# Patient Record
Sex: MALE | Race: BLACK OR AFRICAN AMERICAN | URBAN - METROPOLITAN AREA
[De-identification: names, ages, dates, MRNs, and addresses within clinical notes are randomized per-mention and may not be internally consistent; named-entity substitution may affect disease eponyms.]

---

## 2024-10-04 ENCOUNTER — EDUCATION (OUTPATIENT)
Dept: HEMATOLOGY/ONCOLOGY | Facility: CLINIC | Age: 49
End: 2024-10-04

## 2024-10-04 DIAGNOSIS — Z52.001 STEM CELL DONOR: Primary | ICD-10-CM

## 2024-11-04 PROCEDURE — 81378 HLA I & II TYPING HR: CPT | Performed by: INTERNAL MEDICINE

## 2024-11-04 PROCEDURE — 81382 HLA II TYPING 1 LOC HR: CPT | Performed by: INTERNAL MEDICINE

## 2024-11-05 ENCOUNTER — LAB VISIT (OUTPATIENT)
Dept: LAB | Facility: HOSPITAL | Age: 49
End: 2024-11-05
Payer: MEDICARE

## 2024-11-05 DIAGNOSIS — Z52.001 STEM CELL DONOR: ICD-10-CM

## 2024-11-05 PROCEDURE — 86900 BLOOD TYPING SEROLOGIC ABO: CPT | Performed by: INTERNAL MEDICINE

## 2024-11-05 PROCEDURE — 36415 COLL VENOUS BLD VENIPUNCTURE: CPT | Performed by: INTERNAL MEDICINE

## 2024-11-06 LAB — ABO + RH BLD: NORMAL

## 2024-11-07 LAB — HLATY INTERPRETATION: NORMAL

## 2024-12-11 ENCOUNTER — DOCUMENTATION ONLY (OUTPATIENT)
Dept: HEMATOLOGY/ONCOLOGY | Facility: CLINIC | Age: 49
End: 2024-12-11
Payer: MEDICARE

## 2024-12-11 LAB
HLA DQA1 1: NORMAL
HLA DQA1 2: NORMAL
HLA DRB4 1: NORMAL
HLA-A 1: NORMAL
HLA-A 2: NORMAL
HLA-B 1: NORMAL
HLA-B 2: NORMAL
HLA-C 1: NORMAL
HLA-C 2: NORMAL
HLA-DPA1 1: NORMAL
HLA-DPA1 2: NORMAL
HLA-DPB1 1: NORMAL
HLA-DPB1 2: NORMAL
HLA-DQB1 1: NORMAL
HLA-DQB1 2: NORMAL
HLA-DRB1 1: NORMAL
HLA-DRB1 2: NORMAL
HLA-DRB3 1: NORMAL
HLA-DRB3 2: NORMAL
HLA-DRB4 2: NORMAL
HLA-DRB5 1: NORMAL
HLA-DRB5 2: NORMAL
Lab: NORMAL
Lab: NORMAL

## 2024-12-13 ENCOUNTER — TELEPHONE (OUTPATIENT)
Dept: HEMATOLOGY/ONCOLOGY | Facility: CLINIC | Age: 49
End: 2024-12-13
Payer: MEDICARE

## 2024-12-13 NOTE — TELEPHONE ENCOUNTER
----- Message from Neeraj sent at 12/12/2024  8:27 AM CST -----  Regarding: Returning a Missed Call  Contact: [phone]  Returning a Missed Call     Caller:Gabriel Nayak        Returning call to: Maranda Saunders     Caller can be reached @: [phone]     Nature of the call: regarding hla typing results

## 2025-05-05 DIAGNOSIS — Z52.001 STEM CELL DONOR: Primary | ICD-10-CM

## 2025-05-08 ENCOUNTER — DOCUMENTATION ONLY (OUTPATIENT)
Dept: HEMATOLOGY/ONCOLOGY | Facility: CLINIC | Age: 50
End: 2025-05-08
Payer: MEDICARE

## 2025-05-08 VITALS — BODY MASS INDEX: 34.13 KG/M2 | WEIGHT: 295 LBS | HEIGHT: 78 IN

## 2025-05-27 ENCOUNTER — OFFICE VISIT (OUTPATIENT)
Dept: HEMATOLOGY/ONCOLOGY | Facility: CLINIC | Age: 50
End: 2025-05-27
Payer: MEDICARE

## 2025-05-27 ENCOUNTER — HOSPITAL ENCOUNTER (OUTPATIENT)
Dept: CARDIOLOGY | Facility: CLINIC | Age: 50
Discharge: HOME | End: 2025-05-27
Payer: MEDICARE

## 2025-05-27 ENCOUNTER — HOSPITAL ENCOUNTER (OUTPATIENT)
Dept: RADIOLOGY | Facility: HOSPITAL | Age: 50
Discharge: HOME | End: 2025-05-27
Attending: INTERNAL MEDICINE
Payer: MEDICARE

## 2025-05-27 ENCOUNTER — OFFICE VISIT (OUTPATIENT)
Dept: PSYCHIATRY | Facility: CLINIC | Age: 50
End: 2025-05-27
Payer: COMMERCIAL

## 2025-05-27 VITALS
SYSTOLIC BLOOD PRESSURE: 150 MMHG | RESPIRATION RATE: 18 BRPM | WEIGHT: 295 LBS | DIASTOLIC BLOOD PRESSURE: 90 MMHG | HEIGHT: 78 IN | OXYGEN SATURATION: 98 % | BODY MASS INDEX: 34.13 KG/M2 | HEART RATE: 56 BPM | TEMPERATURE: 98 F

## 2025-05-27 DIAGNOSIS — Z52.001 STEM CELL DONOR: ICD-10-CM

## 2025-05-27 DIAGNOSIS — E66.01 MORBID OBESITY: Primary | ICD-10-CM

## 2025-05-27 DIAGNOSIS — Z52.001 STEM CELL DONOR: Primary | ICD-10-CM

## 2025-05-27 DIAGNOSIS — Z00.8 ENCOUNTER FOR PSYCHOLOGICAL EVALUATION: ICD-10-CM

## 2025-05-27 LAB
OHS QRS DURATION: 98 MS
OHS QTC CALCULATION: 420 MS

## 2025-05-27 PROCEDURE — 3080F DIAST BP >= 90 MM HG: CPT | Mod: CPTII,S$GLB,, | Performed by: INTERNAL MEDICINE

## 2025-05-27 PROCEDURE — 71046 X-RAY EXAM CHEST 2 VIEWS: CPT | Mod: TC,FY

## 2025-05-27 PROCEDURE — 93005 ELECTROCARDIOGRAM TRACING: CPT | Mod: S$GLB,,, | Performed by: INTERNAL MEDICINE

## 2025-05-27 PROCEDURE — 93010 ELECTROCARDIOGRAM REPORT: CPT | Mod: S$GLB,,, | Performed by: INTERNAL MEDICINE

## 2025-05-27 PROCEDURE — 1159F MED LIST DOCD IN RCRD: CPT | Mod: CPTII,S$GLB,, | Performed by: INTERNAL MEDICINE

## 2025-05-27 PROCEDURE — 99205 OFFICE O/P NEW HI 60 MIN: CPT | Mod: S$GLB,,, | Performed by: INTERNAL MEDICINE

## 2025-05-27 PROCEDURE — 3008F BODY MASS INDEX DOCD: CPT | Mod: CPTII,S$GLB,, | Performed by: INTERNAL MEDICINE

## 2025-05-27 PROCEDURE — 90791 PSYCH DIAGNOSTIC EVALUATION: CPT | Mod: S$GLB,,, | Performed by: CASE MANAGER/CARE COORDINATOR

## 2025-05-27 PROCEDURE — 99999 PR PBB SHADOW E&M-EST. PATIENT-LVL I: CPT | Mod: PBBFAC,,, | Performed by: CASE MANAGER/CARE COORDINATOR

## 2025-05-27 PROCEDURE — 3077F SYST BP >= 140 MM HG: CPT | Mod: CPTII,S$GLB,, | Performed by: INTERNAL MEDICINE

## 2025-05-27 PROCEDURE — 99999 PR PBB SHADOW E&M-EST. PATIENT-LVL III: CPT | Mod: PBBFAC,,, | Performed by: INTERNAL MEDICINE

## 2025-05-27 PROCEDURE — 71046 X-RAY EXAM CHEST 2 VIEWS: CPT | Mod: 26,,, | Performed by: RADIOLOGY

## 2025-05-27 RX ORDER — ALPRAZOLAM 1 MG/1
1 TABLET ORAL DAILY
COMMUNITY

## 2025-05-27 RX ORDER — PANTOPRAZOLE SODIUM 20 MG/1
20 TABLET, DELAYED RELEASE ORAL DAILY
COMMUNITY

## 2025-05-27 NOTE — PROGRESS NOTES
Section of Hematology and Stem Cell Transplantation  New Patient Consult     Date of visit: 5/27/25  Visit diagnosis:  Allo stem cell donor evaluation and consent  Referred by:  Jared Esteban    Oncologic History:     Primary Oncologic Diagnosis: Allogeneic Stem Cell Donor       History of Present Ilness:   Gabriel Nayak (Gabriel) is a pleasant 49 y.o.male with anxiety and GERD who presents for allo SCT donor evaluation.     PSH:  Hernia repair (inguinal and umbilical)    FH:  Dad - throat cancer  Mother - CHF  Grandfather (both sides) - prostate cancer  Grandfather (mother side) - CHF    SH:  Works for [employer]. Never smoker, very occasional alcohol, never recreational drugs.     No allergies.     Mr. Nayak is doing well and willing to be a bone marrow harvest donor for his sister.    PAST MEDICAL HISTORY:   Past Medical History:   Diagnosis Date    Anxiety     GERD (gastroesophageal reflux disease)        PAST SURGICAL HISTORY:   Past Surgical History:   Procedure Laterality Date    INGUINAL HERNIA REPAIR Right     REPAIR, HERNIA, UMBILICAL         PAST SOCIAL HISTORY:  Social History[1]    FAMILY HISTORY:  No family history on file.    CURRENT MEDICATIONS:   Current Outpatient Medications   Medication Sig    ALPRAZolam (XANAX) 1 MG tablet Take 1 mg by mouth once daily.    pantoprazole (PROTONIX) 20 MG tablet Take 20 mg by mouth once daily.     No current facility-administered medications for this visit.       ALLERGIES:   Review of patient's allergies indicates:  No Known Allergies    Review of Systems:     Pertinent positives and negatives included in the HPI. Otherwise a complete review of systems is negative.    Physical Exam:     Vitals:    05/27/25 0957   BP: (!) 150/90   Pulse: (!) 56   Resp: 18   Temp: 98 °F (36.7 °C)         General: Appears well, NAD  HEENT: MMM, no OP lesions  Pulmonary: CTAB, no increased work of breathing, no W/R/C  Cardiovascular: S1S2 normal, RRR, no  M/R/G  Abdominal: Soft, NT, ND, BS+, no HSM  Extremities: No C/C/E  Neurological: AAOx4, grossly normal, no focal deficits  Dermatologic: No appreciable rashes or lesions  Lymphatic: No cervical, axillary, or inguinal lymphadenopathy     ECOG Performance Status: (foot note - ECOG PS provided by Eastern Cooperative Oncology Group) 0 - Asymptomatic    Karnofsky Performance Score:  100%- Normal, No Complaints, No Evidence of Disease    Labs:   Lab Results   Component Value Date    WBC 6.8 12/01/2020    RBC 4.63 (L) 12/01/2020    HGB 13.6 (L) 12/01/2020    HCT 40.5 (L) 12/01/2020    MCV 87.5 12/01/2020    MCH 29.4 12/01/2020    MCHC 33.6 12/01/2020    RDW 12.5 12/01/2020     12/01/2020    MPV 9.8 12/01/2020       CMP  Sodium   Date Value Ref Range Status   12/01/2020 143 136 - 145 mmol/L Final     Potassium   Date Value Ref Range Status   12/01/2020 4.2 3.5 - 5.1 mmol/L Final     Chloride   Date Value Ref Range Status   12/01/2020 107 98 - 107 mmol/L Final     CO2   Date Value Ref Range Status   12/01/2020 24 22 - 29 mmol/L Final     Glucose   Date Value Ref Range Status   12/01/2020 114 (H) 74 - 100 mg/dL Final     Blood Urea Nitrogen   Date Value Ref Range Status   12/01/2020 9.6 8.9 - 20.6 mg/dL Final     Creatinine   Date Value Ref Range Status   12/01/2020 0.77 0.73 - 1.18 mg/dL Final     Calcium   Date Value Ref Range Status   12/01/2020 8.4 8.4 - 10.2 mg/dL Final     Protein Total   Date Value Ref Range Status   12/01/2020 6.6 6.4 - 8.3 gm/dL Final     Albumin   Date Value Ref Range Status   12/01/2020 3.3 (L) 3.5 - 5.0 gm/dL Final     Bilirubin Total   Date Value Ref Range Status   12/01/2020 0.5 <<=1.5 mg/dL Final     ALP   Date Value Ref Range Status   12/01/2020 107 40 - 150 unit/L Final     AST   Date Value Ref Range Status   12/01/2020 27 5 - 34 unit/L Final     ALT   Date Value Ref Range Status   12/01/2020 35 0 - 55 unit/L Final     Estimated GFR-Non    Date Value Ref Range Status    12/01/2020 >60 mL/min/1.73 m2 Final          Assessment and Plan:   Gabriel Nayak (Gabriel) is a pleasant 49 y.o.male with anxiety and GERD who presents for allo SCT donor evaluation.       Allogeneic Stem Cell Donor   --Extensive evaluation is ongoing   --Plan for bone marrow harvest on 6/13  --meeting with blood bank, psych onc, to complete evaluation process  --Discussed transplant and risk including bone marrow harvest and risk of fever, arthralgias, and abdominal pain. Discussed central line placement and risk of bleeding or pain. Discussed stem cell collection and risk of hypotension, electrolyte changes, and cytopenias. Discussed storage and DMSO and risk of DMSO reaction during infusion. The risk of dying is less than 1%. We completed the transfusion consent, storage agreement, and donor specific consents. Patient will continue evaluation.  --Will present at next transplant evaluation meting      80 minutes in total were spent on this encounter of which 60 minutes were spent face to face with the patient and his family to discuss the disease, natural history, treatment options and survival statistics. I have provided the patient with an opportunity to ask questions and have all questions answered to his satisfaction.         he will return to clinic based on transplant coordinators schedule, but knows to call in the interim if symptoms change or should a problem arise.      Roberta Millard MD  Hematology and Medical Oncology  Bone Marrow Transplant  [facility]       [1]   Social History  Tobacco Use    Smoking status: Never    Smokeless tobacco: Never   Substance Use Topics    Alcohol use: Yes    Drug use: Never      Ankle Sprain    Illustration of an ankle sprain caused by a foot turning outward and a foot turning inward.    An ankle sprain is a stretch or tear in a ligament in the ankle. Ligaments are tissues that connect bones to each other.    The two most common types of ankle sprains are:  •Inversion sprain. This happens when the foot turns inward and the ankle rolls outward. It affects the ligament on the outside of the foot (lateral ligament).      •Eversion sprain. This happens when the foot turns outward and the ankle rolls inward. It affects the ligament on the inner side of the foot (medial ligament).        What are the causes?    This condition is often caused by accidentally rolling or twisting the ankle.      What increases the risk?    You are more likely to develop this condition if you play sports.      What are the signs or symptoms?  Comparison of a normal ankle and an ankle that is swollen and bruised.   Symptoms of this condition include:  •Pain in your ankle.      •Swelling.      •Bruising. This may develop right after you sprain your ankle or 1–2 days later.      •Trouble standing or walking, especially when you turn or change directions.        How is this diagnosed?    This condition is diagnosed with:  •A physical exam. During the exam, your health care provider will press on certain parts of your foot and ankle and try to move them in certain ways.      •X-ray imaging. These may be taken to see how severe the sprain is and to check for broken bones.        How is this treated?    This condition may be treated with:  •A brace or splint. This is used to keep the ankle from moving until it heals.      •An elastic bandage. This is used to support the ankle.      •Crutches.      •Pain medicine.      •Surgery. This may be needed if the sprain is severe.      •Physical therapy. This may help to improve the range of motion in the ankle.        Follow these instructions at home:    If you have a brace or a splint:     •Wear the brace or splint as told by your health care provider. Remove it only as told by your health care provider.      •Loosen the brace or splint if your toes tingle, become numb, or turn cold and blue.      •Keep the brace or splint clean.    •If the brace or splint is not waterproof:  •Do not let it get wet.       •Cover it with a watertight covering when you take a bath or a shower.        If you have an elastic bandage (dressing):     •Remove it to shower or bathe.      •Try not to move your ankle much, but wiggle your toes from time to time. This helps to prevent swelling.      •Adjust the dressing to make it more comfortable if it feels too tight.      •Loosen the dressing if you have numbness or tingling in your foot, or if your foot becomes cold and blue.        Managing pain, stiffness, and swelling   A bag of ice on a towel on the skin.   •Take over-the-counter and prescription medicines only as told by your health care provider.      •For 2–3 days, keep your ankle raised (elevated) above the level of your heart as much as possible.    •If directed, put ice on the injured area:  •If you have a removable brace or splint, remove it as told by your health care provider.      •Put ice in a plastic bag.      •Place a towel between your skin and the bag.      •Leave the ice on for 20 minutes, 2–3 times a day.        General instructions     •Rest your ankle.      • Do not use the injured limb to support your body weight until your health care provider says that you can. Use crutches as told by your health care provider.      • Do not use any products that contain nicotine or tobacco, such as cigarettes, e-cigarettes, and chewing tobacco. If you need help quitting, ask your health care provider.      •Keep all follow-up visits as told by your health care provider. This is important.        Contact a health care provider if:    •You have rapidly increasing bruising or swelling.      •Your pain is not relieved with medicine.        Get help right away if:    •Your foot or toes become numb or blue.      •You have severe pain that gets worse.        Summary    •An ankle sprain is a stretch or tear in a ligament in the ankle. Ligaments are tissues that connect bones to each other.      •This condition is often caused by accidentally rolling or twisting the ankle.      •Symptoms include pain, swelling, bruising, and trouble walking.      •To relieve pain and swelling, put ice on the affected ankle, raise your ankle above the level of your heart, and use an elastic bandage.      •Keep all follow-up visits as told by your health care provider. This is important.      This information is not intended to replace advice given to you by your health care provider. Make sure you discuss any questions you have with your health care provider.

## 2025-05-27 NOTE — H&P (VIEW-ONLY)
Section of Hematology and Stem Cell Transplantation  New Patient Consult     Date of visit: 5/27/25  Visit diagnosis:  Allo stem cell donor evaluation and consent  Referred by:  Jared Esteban    Oncologic History:     Primary Oncologic Diagnosis: Allogeneic Stem Cell Donor       History of Present Ilness:   Gabriel Nayak (Gabriel) is a pleasant 49 y.o.male with anxiety and GERD who presents for allo SCT donor evaluation.     PSH:  Hernia repair (inguinal and umbilical)    FH:  Dad - throat cancer  Mother - CHF  Grandfather (both sides) - prostate cancer  Grandfather (mother side) - CHF    SH:  Works for [employer]. Never smoker, very occasional alcohol, never recreational drugs.     No allergies.     Mr. Nayak is doing well and willing to be a bone marrow harvest donor for his sister.    PAST MEDICAL HISTORY:   Past Medical History:   Diagnosis Date    Anxiety     GERD (gastroesophageal reflux disease)        PAST SURGICAL HISTORY:   Past Surgical History:   Procedure Laterality Date    INGUINAL HERNIA REPAIR Right     REPAIR, HERNIA, UMBILICAL         PAST SOCIAL HISTORY:  Social History[1]    FAMILY HISTORY:  No family history on file.    CURRENT MEDICATIONS:   Current Outpatient Medications   Medication Sig    ALPRAZolam (XANAX) 1 MG tablet Take 1 mg by mouth once daily.    pantoprazole (PROTONIX) 20 MG tablet Take 20 mg by mouth once daily.     No current facility-administered medications for this visit.       ALLERGIES:   Review of patient's allergies indicates:  No Known Allergies    Review of Systems:     Pertinent positives and negatives included in the HPI. Otherwise a complete review of systems is negative.    Physical Exam:     Vitals:    05/27/25 0957   BP: (!) 150/90   Pulse: (!) 56   Resp: 18   Temp: 98 °F (36.7 °C)         General: Appears well, NAD  HEENT: MMM, no OP lesions  Pulmonary: CTAB, no increased work of breathing, no W/R/C  Cardiovascular: S1S2 normal, RRR, no  M/R/G  Abdominal: Soft, NT, ND, BS+, no HSM  Extremities: No C/C/E  Neurological: AAOx4, grossly normal, no focal deficits  Dermatologic: No appreciable rashes or lesions  Lymphatic: No cervical, axillary, or inguinal lymphadenopathy     ECOG Performance Status: (foot note - ECOG PS provided by Eastern Cooperative Oncology Group) 0 - Asymptomatic    Karnofsky Performance Score:  100%- Normal, No Complaints, No Evidence of Disease    Labs:   Lab Results   Component Value Date    WBC 6.8 12/01/2020    RBC 4.63 (L) 12/01/2020    HGB 13.6 (L) 12/01/2020    HCT 40.5 (L) 12/01/2020    MCV 87.5 12/01/2020    MCH 29.4 12/01/2020    MCHC 33.6 12/01/2020    RDW 12.5 12/01/2020     12/01/2020    MPV 9.8 12/01/2020       CMP  Sodium   Date Value Ref Range Status   12/01/2020 143 136 - 145 mmol/L Final     Potassium   Date Value Ref Range Status   12/01/2020 4.2 3.5 - 5.1 mmol/L Final     Chloride   Date Value Ref Range Status   12/01/2020 107 98 - 107 mmol/L Final     CO2   Date Value Ref Range Status   12/01/2020 24 22 - 29 mmol/L Final     Glucose   Date Value Ref Range Status   12/01/2020 114 (H) 74 - 100 mg/dL Final     Blood Urea Nitrogen   Date Value Ref Range Status   12/01/2020 9.6 8.9 - 20.6 mg/dL Final     Creatinine   Date Value Ref Range Status   12/01/2020 0.77 0.73 - 1.18 mg/dL Final     Calcium   Date Value Ref Range Status   12/01/2020 8.4 8.4 - 10.2 mg/dL Final     Protein Total   Date Value Ref Range Status   12/01/2020 6.6 6.4 - 8.3 gm/dL Final     Albumin   Date Value Ref Range Status   12/01/2020 3.3 (L) 3.5 - 5.0 gm/dL Final     Bilirubin Total   Date Value Ref Range Status   12/01/2020 0.5 <<=1.5 mg/dL Final     ALP   Date Value Ref Range Status   12/01/2020 107 40 - 150 unit/L Final     AST   Date Value Ref Range Status   12/01/2020 27 5 - 34 unit/L Final     ALT   Date Value Ref Range Status   12/01/2020 35 0 - 55 unit/L Final     Estimated GFR-Non    Date Value Ref Range Status    12/01/2020 >60 mL/min/1.73 m2 Final          Assessment and Plan:   Gabriel Nayak (Gabriel) is a pleasant 49 y.o.male with anxiety and GERD who presents for allo SCT donor evaluation.       Allogeneic Stem Cell Donor   --Extensive evaluation is ongoing   --Plan for bone marrow harvest on 6/13  --meeting with blood bank, psych onc, to complete evaluation process  --Discussed transplant and risk including bone marrow harvest and risk of fever, arthralgias, and abdominal pain. Discussed central line placement and risk of bleeding or pain. Discussed stem cell collection and risk of hypotension, electrolyte changes, and cytopenias. Discussed storage and DMSO and risk of DMSO reaction during infusion. The risk of dying is less than 1%. We completed the transfusion consent, storage agreement, and donor specific consents. Patient will continue evaluation.  --Will present at next transplant evaluation meting      80 minutes in total were spent on this encounter of which 60 minutes were spent face to face with the patient and his family to discuss the disease, natural history, treatment options and survival statistics. I have provided the patient with an opportunity to ask questions and have all questions answered to his satisfaction.         he will return to clinic based on transplant coordinators schedule, but knows to call in the interim if symptoms change or should a problem arise.      Roberta Millard MD  Hematology and Medical Oncology  Bone Marrow Transplant  [facility]       [1]   Social History  Tobacco Use    Smoking status: Never    Smokeless tobacco: Never   Substance Use Topics    Alcohol use: Yes    Drug use: Never

## 2025-05-27 NOTE — LETTER
May 27, 2025        Roberta Millard MD  [address]  [facility] [address]             [facility] - Psychiatry  [address]  [facility] [address]  Phone: [phone]  Fax: [phone]   Patient: Gabriel Nayak   MR Number: [id number]   YOB: 1975   Date of Visit: 5/27/2025       Dear Dr. Millard:    Thank you for referring Gabriel Nayak to me for evaluation. Below are the relevant portions of my assessment and plan of care.    SUMMARY AND RECOMMENDATIONS:   Gabriel Nayak is a  49 y.o. male referred by Dr. Millard for psychological evaluation prior to stem cell donation.  The patient appears absent of disabling psychopathology or disabilities which would prevent understanding and compliance with medical treatment.  There is no evidence of suicidality.  The patient has good knowledge about stem cell donation, appropriate expectations for health and illness following donation, and adequate  understanding of the possible risks and complications of this procedure.    He did demonstrate some mild impairment in cognitive functioning, but retains the ability to complete all ADLs and has the ability to consent to donation based upon knowledge and understanding of the procedure. He also reported he did not eat much today prior today meeting with me and did not get a break for lunch, thus this could have affected his MoCA score slightly.  He reports adequate compliance with previous medical treatment.  Gabriel Nayak has satisfactory social support.  The patient exhibits an adequate degree of social stability.  The patient acknowledges no significant stressors expected to limit his ability to cope with the demands of stem cell donation. He reported some work stress and history of anxiety but appears able to manage it effectively at this time.  The patient reports no tobacco use and no illicit drug use.  The patient reports minimal caffeine consumption and occasional alcohol  consumption that he described as a couple of drinks per week.  He demonstrates adequate health literacy.        Impressions:  Gabriel Nayak is an acceptable stem cell donation candidate from a psychological perspective.   There are no overt psychological contraindications for proceeding with the procedure.His psychological symptoms are well-managed on his current psychotropic regimen. The patient has reasonable expectations for the procedure, good social support, and has already begun making appropriate life plans in anticipation of the procedure.        If you have questions, please do not hesitate to call me. I look forward to following Gabriel along with you.    Sincerely,      Jared Esteban PsyD           CC  Maranda Saunders RN

## 2025-05-27 NOTE — PROGRESS NOTES
Psycho-Oncology Stem Cell Donor Evaluation  Psychiatry Initial Visit (PhD)    Date:  5/27/2025     CPT Code: 14379 Evaluation Length (direct face-to-face time):  1 hour;    INFORMED CONSENT/ LIMITS of CONFIDENTIALITY: Prior to beginning the interview, the patient's identification was confirmed via name and date of birth.The patient was informed of the possible risks and benefits of psychological interventions (e.g., counseling, psychotherapy, testing) and provided information regarding the handling of protected health records and the limits of confidentiality, including the importance of reporting any suicidal or homicidal ideation to ensure safety of all parties. This provider explained the purpose of today's appointment and the patient was provided with time to ask questions regarding this information.  Acceptance and understanding of these conditions was expressed, and Gabriel Nayak freely consented to this evaluation.       Referred by:  BMT Team/ Oncologist: BRENNEN Millard MD.     Chief complaint/reason for encounter:  Psychological Evaluation prior to stem cell donation    Clinical status of patient: Outpatient    Gabriel Nayak, a 49 y.o. male, was seen for pre-stem cell donation evaluation.  Met with patient. His primary care physician is No primary care provider on file..         Medical/Surgical History:   There is no problem list on file for this patient.       Health Behaviors:       ETOH Use: Yes, patient noted occasional with usually a couple drinks a week      Tobacco Use: No   Illicit Drug Use:  No     Prescription Misuse:No   Caffeine: minimal   Exercise:Patient noted walking three to four days per week.   Firearms:  Yes, patient noted firearms stored locked up   Advanced directives:None in chart    Family History:   Psychiatric illness: No     Alcohol/Drug Abuse: No     Suicide: No      Past Psychiatric History:   Inpatient treatment: No     Outpatient treatment: No     Prior substance abuse  treatment: No     Suicide Attempts: No      Psychotropic Medications:  Current: Xanax reportedly prescribed by his PCP       Past: Lunesta for sleep    Current medications as per below, allergies reviewed in chart.  Current Outpatient Medications   Medication    ALPRAZolam (XANAX) 1 MG tablet    pantoprazole (PROTONIX) 20 MG tablet     No current facility-administered medications for this visit.           Social situation  Living/Social History  Current living situation: lives with his wife in [place], [place]  Marital status:   Children/Dependents: Three adult children  Social support: good   Primary supports: spouse and best friend    Spiritual/Anabaptism: Buddhism Buddhism.  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 Able to take time off work without financial concerns: yes.   Status: no.   Partner/Spouse Employment Status: employed    Educational/ Linguistic History  Education level: 4 years of college with AA completed      Stressors: Current: Patient noted work is his primary stressor  Additional stressors: None noted at this time  Strengths:Able to vocalize needs, Vocational interests, hobbies and/or talents, and Interpersonal relationships and supports available - family, relatives, friends      History of present illness: Patient is a prospective stem cell donor for one of his sisters.    Gabriel Nayak has adjusted to sister's illness well primarily through communication with his family and educating himself on her illness.  The patient has good family support.  His family is coping well with sister's diagnosis/treatment/prognosis. He has engaged in appropriate information gathering about stem cell donation. The patient reports the following potential barriers to stem cell donation:none reported at this time.   Donation-related psychosocial stressors include none reported at this time as patient noted he will be able to take off time for work.     Stem Cell Donation:  Gabriel Nayak possesses a good level of knowledge about stem cell donation gleaned from discussions and materials provided by program personnel and the internet .  Gabriel Nayak is knowledgeable about the possible costs, risks, and complications of the procedure and the behavioral changes which will be required of him.  He has anticipated his recovery needs and has planned adequate time away from work and assistance from his wife to facilitate healing (if needed). The patient reports good compliance with medical treatment in the past, which is supported by review of his medical chart.  Gabriel Nayak has realistic expectations of health and illness possibilities following stem cell donation. He is aware that medical side effects are rare. He is aware of possible medical side effects of donating bone marrow to include back or hip pain, fatigue, and muscle pain, and anesthesia effects including throat pain, insomnia, headache, dizziness, loss of appetite, and nausea. He knows the median time to full recovery for a marrow donation is 20 days. He also anticipates potential psychological discomfort/sadness if sister's clinical course is not positive.     Psychological Screening:   Distress thermometer:       5/27/2025    10:03 AM 5/21/2025    12:54 PM   DISTRESS SCREENING   Distress Score 0 - No Distress 4   Practical Concerns  None of these   Social Concerns  None of these   Emotional Concerns  None of these   Spiritual or Advent Concerns  None of these   Physical Concerns  None of these       PHQ ANSWERS    Q1. Little interest or pleasure in doing things: Not at all (05/27/25 1331)  Q2. Feeling down, depressed, or hopeless: Not at all (05/27/25 1331)  Q3. Trouble falling or staying asleep, or sleeping too much: Not at all (05/27/25 1331)  Q4.  Feeling tired or having little energy: Not at all (05/27/25 1331)  Q5. Poor appetite or overeating: Not at all (05/27/25 1331)  Q6. Feeling bad about yourself - or that you are a failure or have let yourself or your family down: Not at all (05/27/25 1331)  Q7. Trouble concentrating on things, such as reading the newspaper or watching television: Not at all (05/27/25 1331)  Q8. Moving or speaking so slowly that other people could have noticed. Or the opposite - being so fidgety or restless that you have been moving around a lot more than usual: Not at all (05/27/25 1331)  Q9. Thoughts that you would be better off dead, or of hurting yourself in some way: Not at all (05/27/25 1331)    PHQ8 Score : 0 (05/27/25 1331)  PHQ-9 Total Score: 0 (05/27/25 1331)         5/27/2025     1:31 PM   GAD7   1. Feeling nervous, anxious, or on edge? 0   2. Not being able to stop or control worrying? 0   3. Worrying too much about different things? 1   4. Trouble relaxing? 1   5. Being so restless that it is hard to sit still? 1   6. Becoming easily annoyed or irritable? 1   7. Feeling afraid as if something awful might happen? 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 Patient also wears glasses.  Sleep: Patient noted he sleeps 6-8 hours per night and takes Xanax before bed.  Trauma:Mother's unexpected death a little over 10 years ago. No significant symptoms.  Head Injury History: Denies  Personality Functioning: The patient does not display any personality characteristics which would be an  impediment to donating bone marrow.      Mental Status Exam:    General appearance:  appears stated age, neatly dressed, well groomed  Level of cooperation:  cooperative  Thought processes:  logical, goal-directed   Speech: normal in rate, volume, and tone    Mood: euthymic  Affect: mood congruent and appropriate  Thought content:  no illusions, no visual hallucinations, no auditory hallucinations, no delusions, no active or passive homicidal thoughts, no active or passive suicidal ideation, no obsessions, no compulsions, no violence  Orientation:  oriented to person, place, and time  Memory:  Recent memory:  2 of 5 objects after brief delay.    Remote memory - intact  Attention span and concentration:  Patient scored 6/6 on MoCA questions related to attention and appeared to attend to evaluation without distraction.  Abstract reasoning:    Similarities: some slight difficulties with abstraction as he scored a 1/2 in this area of MoCA  Judgment and insight: fair to good  Language:  Intact    MOCA  [place] Cognitive Assessment (MOCA), a cognitive screener, was administered to assess the patient's current mental status and cognitive capacity. Patient obtained a score of 25/30. This score does not fall within normal limits as compared to those within similar age and level of education, and suggests mild impairments in neurocognitive functioning. Patient's ability to complete ADLS is not impacted. Patient did acknowledge that he has not eaten much today and that he did not have a break for lunch before this appointment with me. This could have slightly affected his MoCA score. Area of most difficulty was delayed memory.    REALM-R:  Sufficient health literacy    PCS:   Pain Catastrophizing Scale: (Total=0, 2nd%ile; Rumination=0, 3rd%ile;; Magnification=0, 14th%ile; Helplessness=0, 6th%ile) . No elevations on PCS total score, helplessness, magnification, and rumination suggest adequate pain coping.       SUMMARY AND  RECOMMENDATIONS:   Gabriel Nayak is a  49 y.o. male referred by Dr. Millard for psychological evaluation prior to stem cell donation.  The patient appears absent of disabling psychopathology or disabilities which would prevent understanding and compliance with medical treatment.  There is no evidence of suicidality.  The patient has good knowledge about stem cell donation, appropriate expectations for health and illness following donation, and adequate  understanding of the possible risks and complications of this procedure.    He did demonstrate some mild impairment in cognitive functioning, but retains the ability to complete all ADLs and has the ability to consent to donation based upon knowledge and understanding of the procedure. He also reported he did not eat much today prior today meeting with me and did not get a break for lunch, thus this could have affected his MoCA score slightly.  He reports adequate compliance with previous medical treatment.  Gabriel Nayak has satisfactory social support.  The patient exhibits an adequate degree of social stability.  The patient acknowledges no significant stressors expected to limit his ability to cope with the demands of stem cell donation. He reported some work stress and history of anxiety but appears able to manage it effectively at this time.  The patient reports no tobacco use and no illicit drug use.  The patient reports minimal caffeine consumption and occasional alcohol consumption that he described as a couple of drinks per week.  He demonstrates adequate health literacy.        Impressions:  Gabriel Nayak is an acceptable stem cell donation candidate from a psychological perspective.   There are no overt psychological contraindications for proceeding with the procedure.His psychological symptoms are well-managed on his current psychotropic regimen. The patient has reasonable expectations for the procedure, good social support, and has already  begun making appropriate life plans in anticipation of the procedure.       Assessment - Diagnosis - Goals:     1. Stem cell donor    2. Encounter for psychological evaluation           Jared Esteban Psy.D.  Clinical Psychologist  [place] License #[id number]  [place] License #[id number]

## 2025-06-11 ENCOUNTER — DOCUMENTATION ONLY (OUTPATIENT)
Dept: HEMATOLOGY/ONCOLOGY | Facility: CLINIC | Age: 50
End: 2025-06-11
Payer: COMMERCIAL

## 2025-06-11 NOTE — PROGRESS NOTES
Spoke to patient.  Informed to arrive on 6/13 at 9 am to the [facility] for his procedure.  Nothing to eat or drink after midnight.  Wear something comfortable and have someone to drive him.  Verbalized understanding

## 2025-06-12 ENCOUNTER — ANESTHESIA EVENT (OUTPATIENT)
Dept: SURGERY | Facility: HOSPITAL | Age: 50
End: 2025-06-12

## 2025-06-13 ENCOUNTER — HOSPITAL ENCOUNTER (OUTPATIENT)
Facility: HOSPITAL | Age: 50
Discharge: HOME | End: 2025-06-13
Attending: INTERNAL MEDICINE | Admitting: INTERNAL MEDICINE
Payer: MEDICARE

## 2025-06-13 ENCOUNTER — ANESTHESIA (OUTPATIENT)
Dept: SURGERY | Facility: HOSPITAL | Age: 50
End: 2025-06-13
Payer: COMMERCIAL

## 2025-06-13 VITALS
DIASTOLIC BLOOD PRESSURE: 88 MMHG | SYSTOLIC BLOOD PRESSURE: 140 MMHG | RESPIRATION RATE: 20 BRPM | HEART RATE: 71 BPM | OXYGEN SATURATION: 79 % | TEMPERATURE: 98 F

## 2025-06-13 DIAGNOSIS — Z52.001 STEM CELL DONOR: ICD-10-CM

## 2025-06-13 DIAGNOSIS — Z52.001 STEM CELL DONOR: Primary | ICD-10-CM

## 2025-06-13 LAB
ABO + RH BLD: NORMAL
ABSOLUTE EOSINOPHIL (OHS): 0.09 K/UL
ABSOLUTE MONOCYTE (OHS): 0.48 K/UL (ref 0.3–1)
ABSOLUTE NEUTROPHIL COUNT (OHS): 3.4 K/UL (ref 1.8–7.7)
ALBUMIN SERPL BCP-MCNC: 4.1 G/DL (ref 3.5–5.2)
ALP SERPL-CCNC: 80 UNIT/L (ref 40–150)
ALT SERPL W/O P-5'-P-CCNC: 55 UNIT/L (ref 10–44)
ANION GAP (OHS): 8 MMOL/L (ref 8–16)
AST SERPL-CCNC: 45 UNIT/L (ref 11–45)
BASOPHILS # BLD AUTO: 0.02 K/UL
BASOPHILS NFR BLD AUTO: 0.4 %
BILIRUB SERPL-MCNC: 0.9 MG/DL (ref 0.1–1)
BLD PROD TYP BPU: NORMAL
BLOOD UNIT EXPIRATION DATE: NORMAL
BLOOD UNIT TYPE CODE: 5300
BUN SERPL-MCNC: 13 MG/DL (ref 6–20)
CALCIUM SERPL-MCNC: 8.3 MG/DL (ref 8.7–10.5)
CHLORIDE SERPL-SCNC: 106 MMOL/L (ref 95–110)
CO2 SERPL-SCNC: 25 MMOL/L (ref 23–29)
CREAT SERPL-MCNC: 0.8 MG/DL (ref 0.5–1.4)
CROSSMATCH INTERPRETATION: NORMAL
DISPENSE STATUS: NORMAL
ERYTHROCYTE [DISTWIDTH] IN BLOOD BY AUTOMATED COUNT: 13.3 % (ref 11.5–14.5)
GFR SERPLBLD CREATININE-BSD FMLA CKD-EPI: >60 ML/MIN/1.73/M2
GLUCOSE SERPL-MCNC: 106 MG/DL (ref 70–110)
HCT VFR BLD AUTO: 40.1 % (ref 40–54)
HGB BLD-MCNC: 13.7 GM/DL (ref 14–18)
IMM GRANULOCYTES # BLD AUTO: 0.01 K/UL (ref 0–0.04)
IMM GRANULOCYTES NFR BLD AUTO: 0.2 % (ref 0–0.5)
INDIRECT COOMBS: NORMAL
LYMPHOCYTES # BLD AUTO: 1.64 K/UL (ref 1–4.8)
MCH RBC QN AUTO: 29.5 PG (ref 27–31)
MCHC RBC AUTO-ENTMCNC: 34.2 G/DL (ref 32–36)
MCV RBC AUTO: 86 FL (ref 82–98)
NUCLEATED RBC (/100WBC) (OHS): 0 /100 WBC
PLATELET # BLD AUTO: 178 K/UL (ref 150–450)
PMV BLD AUTO: 10 FL (ref 9.2–12.9)
POTASSIUM SERPL-SCNC: 3.7 MMOL/L (ref 3.5–5.1)
PROT SERPL-MCNC: 7.2 GM/DL (ref 6–8.4)
RBC # BLD AUTO: 4.64 M/UL (ref 4.6–6.2)
RELATIVE EOSINOPHIL (OHS): 1.6 %
RELATIVE LYMPHOCYTE (OHS): 29.1 % (ref 18–48)
RELATIVE MONOCYTE (OHS): 8.5 % (ref 4–15)
RELATIVE NEUTROPHIL (OHS): 60.2 % (ref 38–73)
RH BLD: NORMAL
SODIUM SERPL-SCNC: 139 MMOL/L (ref 136–145)
SPECIMEN OUTDATE: NORMAL
UNIT NUMBER: NORMAL
WBC # BLD AUTO: 5.64 K/UL (ref 3.9–12.7)

## 2025-06-13 PROCEDURE — 38215 HARVEST STEM CELL CONCENTRTE: CPT

## 2025-06-13 PROCEDURE — 86920 COMPATIBILITY TEST SPIN: CPT | Performed by: INTERNAL MEDICINE

## 2025-06-13 PROCEDURE — 38230 BONE MARROW HARVEST ALLOGEN: CPT | Mod: ,,, | Performed by: INTERNAL MEDICINE

## 2025-06-13 PROCEDURE — 63600175 PHARM REV CODE 636 W HCPCS: Performed by: INTERNAL MEDICINE

## 2025-06-13 PROCEDURE — D9220A PRA ANESTHESIA: Mod: CRNA,,, | Performed by: NURSE ANESTHETIST, CERTIFIED REGISTERED

## 2025-06-13 PROCEDURE — 86900 BLOOD TYPING SEROLOGIC ABO: CPT | Performed by: INTERNAL MEDICINE

## 2025-06-13 PROCEDURE — P9021 RED BLOOD CELLS UNIT: HCPCS | Performed by: INTERNAL MEDICINE

## 2025-06-13 PROCEDURE — 36000704 HC OR TIME LEV I 1ST 15 MIN: Performed by: INTERNAL MEDICINE

## 2025-06-13 PROCEDURE — 25000003 PHARM REV CODE 250: Performed by: STUDENT IN AN ORGANIZED HEALTH CARE EDUCATION/TRAINING PROGRAM

## 2025-06-13 PROCEDURE — 37000008 HC ANESTHESIA 1ST 15 MINUTES: Performed by: INTERNAL MEDICINE

## 2025-06-13 PROCEDURE — 63600175 PHARM REV CODE 636 W HCPCS: Performed by: ANESTHESIOLOGY

## 2025-06-13 PROCEDURE — 27201040 HC RC 50 FILTER: Performed by: INTERNAL MEDICINE

## 2025-06-13 PROCEDURE — 63600175 PHARM REV CODE 636 W HCPCS: Performed by: NURSE ANESTHETIST, CERTIFIED REGISTERED

## 2025-06-13 PROCEDURE — 84460 ALANINE AMINO (ALT) (SGPT): CPT | Performed by: INTERNAL MEDICINE

## 2025-06-13 PROCEDURE — 85025 COMPLETE CBC W/AUTO DIFF WBC: CPT | Performed by: INTERNAL MEDICINE

## 2025-06-13 PROCEDURE — 37000009 HC ANESTHESIA EA ADD 15 MINS: Performed by: INTERNAL MEDICINE

## 2025-06-13 PROCEDURE — 71000015 HC POSTOP RECOV 1ST HR: Performed by: INTERNAL MEDICINE

## 2025-06-13 PROCEDURE — 71000016 HC POSTOP RECOV ADDL HR: Performed by: INTERNAL MEDICINE

## 2025-06-13 PROCEDURE — 63600175 PHARM REV CODE 636 W HCPCS: Performed by: STUDENT IN AN ORGANIZED HEALTH CARE EDUCATION/TRAINING PROGRAM

## 2025-06-13 PROCEDURE — D9220A PRA ANESTHESIA: Mod: ANES,,, | Performed by: ANESTHESIOLOGY

## 2025-06-13 PROCEDURE — 71000044 HC DOSC ROUTINE RECOVERY FIRST HOUR: Performed by: INTERNAL MEDICINE

## 2025-06-13 PROCEDURE — 25000003 PHARM REV CODE 250: Performed by: NURSE ANESTHETIST, CERTIFIED REGISTERED

## 2025-06-13 PROCEDURE — P9010 WHOLE BLOOD FOR TRANSFUSION: HCPCS | Performed by: INTERNAL MEDICINE

## 2025-06-13 PROCEDURE — 25000003 PHARM REV CODE 250: Performed by: INTERNAL MEDICINE

## 2025-06-13 PROCEDURE — 36000705 HC OR TIME LEV I EA ADD 15 MIN: Performed by: INTERNAL MEDICINE

## 2025-06-13 RX ORDER — BUPIVACAINE HYDROCHLORIDE 2.5 MG/ML
INJECTION, SOLUTION EPIDURAL; INFILTRATION; INTRACAUDAL; PERINEURAL
Status: DISCONTINUED
Start: 2025-06-13 | End: 2025-06-13 | Stop reason: HOSPADM

## 2025-06-13 RX ORDER — MELOXICAM 15 MG/1
15 TABLET ORAL DAILY PRN
Qty: 30 TABLET | Refills: 0 | Status: SHIPPED | OUTPATIENT
Start: 2025-06-13

## 2025-06-13 RX ORDER — GLUCAGON 1 MG
1 KIT INJECTION
Status: DISCONTINUED | OUTPATIENT
Start: 2025-06-13 | End: 2025-06-13 | Stop reason: HOSPADM

## 2025-06-13 RX ORDER — SODIUM CHLORIDE 9 MG/ML
INJECTION, SOLUTION INTRAVENOUS CONTINUOUS
OUTPATIENT
Start: 2025-06-13

## 2025-06-13 RX ORDER — LIDOCAINE HYDROCHLORIDE 20 MG/ML
INJECTION INTRAVENOUS
Status: DISCONTINUED | OUTPATIENT
Start: 2025-06-13 | End: 2025-06-13

## 2025-06-13 RX ORDER — ONDANSETRON 4 MG/1
8 TABLET, ORALLY DISINTEGRATING ORAL EVERY 8 HOURS PRN
OUTPATIENT
Start: 2025-06-13

## 2025-06-13 RX ORDER — PROCHLORPERAZINE EDISYLATE 5 MG/ML
5 INJECTION INTRAMUSCULAR; INTRAVENOUS EVERY 6 HOURS PRN
OUTPATIENT
Start: 2025-06-13

## 2025-06-13 RX ORDER — TRAMADOL HYDROCHLORIDE 50 MG/1
50 TABLET, FILM COATED ORAL EVERY 6 HOURS PRN
Qty: 15 EACH | Refills: 0 | Status: SHIPPED | OUTPATIENT
Start: 2025-06-13

## 2025-06-13 RX ORDER — ACETAMINOPHEN 325 MG/1
650 TABLET ORAL EVERY 4 HOURS PRN
OUTPATIENT
Start: 2025-06-13

## 2025-06-13 RX ORDER — BUPIVACAINE HYDROCHLORIDE 2.5 MG/ML
INJECTION, SOLUTION EPIDURAL; INFILTRATION; INTRACAUDAL; PERINEURAL
Status: DISCONTINUED | OUTPATIENT
Start: 2025-06-13 | End: 2025-06-13 | Stop reason: HOSPADM

## 2025-06-13 RX ORDER — OXYCODONE HYDROCHLORIDE 5 MG/1
5 TABLET ORAL EVERY 4 HOURS PRN
Refills: 0 | OUTPATIENT
Start: 2025-06-13

## 2025-06-13 RX ORDER — MIDAZOLAM HYDROCHLORIDE 1 MG/ML
INJECTION INTRAMUSCULAR; INTRAVENOUS
Status: DISCONTINUED | OUTPATIENT
Start: 2025-06-13 | End: 2025-06-13

## 2025-06-13 RX ORDER — PROPOFOL 10 MG/ML
VIAL (ML) INTRAVENOUS
Status: DISCONTINUED | OUTPATIENT
Start: 2025-06-13 | End: 2025-06-13

## 2025-06-13 RX ORDER — FENTANYL CITRATE 50 UG/ML
INJECTION, SOLUTION INTRAMUSCULAR; INTRAVENOUS
Status: DISCONTINUED | OUTPATIENT
Start: 2025-06-13 | End: 2025-06-13

## 2025-06-13 RX ORDER — ONDANSETRON HYDROCHLORIDE 2 MG/ML
4 INJECTION, SOLUTION INTRAVENOUS EVERY 12 HOURS PRN
OUTPATIENT
Start: 2025-06-13

## 2025-06-13 RX ORDER — ONDANSETRON HYDROCHLORIDE 2 MG/ML
INJECTION, SOLUTION INTRAVENOUS
Status: DISCONTINUED | OUTPATIENT
Start: 2025-06-13 | End: 2025-06-13

## 2025-06-13 RX ORDER — PHENYLEPHRINE HYDROCHLORIDE 10 MG/ML
INJECTION INTRAVENOUS
Status: DISCONTINUED | OUTPATIENT
Start: 2025-06-13 | End: 2025-06-13

## 2025-06-13 RX ORDER — SODIUM CHLORIDE 0.9 % (FLUSH) 0.9 %
3 SYRINGE (ML) INJECTION
Status: DISCONTINUED | OUTPATIENT
Start: 2025-06-13 | End: 2025-06-13 | Stop reason: HOSPADM

## 2025-06-13 RX ORDER — HYDROCODONE BITARTRATE AND ACETAMINOPHEN 500; 5 MG/1; MG/1
TABLET ORAL
Status: DISCONTINUED | OUTPATIENT
Start: 2025-06-13 | End: 2025-06-13 | Stop reason: HOSPADM

## 2025-06-13 RX ORDER — HYDROMORPHONE HYDROCHLORIDE 1 MG/ML
0.2 INJECTION, SOLUTION INTRAMUSCULAR; INTRAVENOUS; SUBCUTANEOUS EVERY 5 MIN PRN
Refills: 0 | Status: DISCONTINUED | OUTPATIENT
Start: 2025-06-13 | End: 2025-06-13 | Stop reason: HOSPADM

## 2025-06-13 RX ORDER — ROCURONIUM BROMIDE 10 MG/ML
INJECTION, SOLUTION INTRAVENOUS
Status: DISCONTINUED | OUTPATIENT
Start: 2025-06-13 | End: 2025-06-13

## 2025-06-13 RX ORDER — TRAMADOL HYDROCHLORIDE 50 MG/1
50 TABLET, FILM COATED ORAL EVERY 6 HOURS
Qty: 15 EACH | Refills: 0 | Status: SHIPPED | OUTPATIENT
Start: 2025-06-13 | End: 2025-06-13

## 2025-06-13 RX ADMIN — PHENYLEPHRINE HYDROCHLORIDE 100 MCG: 10 INJECTION INTRAVENOUS at 01:06

## 2025-06-13 RX ADMIN — HYDROMORPHONE HYDROCHLORIDE 0.2 MG: 1 INJECTION, SOLUTION INTRAMUSCULAR; INTRAVENOUS; SUBCUTANEOUS at 02:06

## 2025-06-13 RX ADMIN — PROPOFOL 40 MG: 10 INJECTION, EMULSION INTRAVENOUS at 01:06

## 2025-06-13 RX ADMIN — ONDANSETRON 4 MG: 2 INJECTION INTRAMUSCULAR; INTRAVENOUS at 01:06

## 2025-06-13 RX ADMIN — PHENYLEPHRINE HYDROCHLORIDE 100 MCG: 10 INJECTION INTRAVENOUS at 12:06

## 2025-06-13 RX ADMIN — SODIUM CHLORIDE: 0.9 INJECTION, SOLUTION INTRAVENOUS at 11:06

## 2025-06-13 RX ADMIN — SUGAMMADEX 200 MG: 100 INJECTION, SOLUTION INTRAVENOUS at 01:06

## 2025-06-13 RX ADMIN — FENTANYL CITRATE 100 MCG: 50 INJECTION, SOLUTION INTRAMUSCULAR; INTRAVENOUS at 11:06

## 2025-06-13 RX ADMIN — MIDAZOLAM HYDROCHLORIDE 2 MG: 2 INJECTION, SOLUTION INTRAMUSCULAR; INTRAVENOUS at 11:06

## 2025-06-13 RX ADMIN — LIDOCAINE HYDROCHLORIDE 100 MG: 20 INJECTION INTRAVENOUS at 11:06

## 2025-06-13 RX ADMIN — HYDROMORPHONE HYDROCHLORIDE 0.2 MG: 1 INJECTION, SOLUTION INTRAMUSCULAR; INTRAVENOUS; SUBCUTANEOUS at 03:06

## 2025-06-13 RX ADMIN — ROCURONIUM BROMIDE 50 MG: 10 INJECTION, SOLUTION INTRAVENOUS at 11:06

## 2025-06-13 RX ADMIN — PROPOFOL 220 MG: 10 INJECTION, EMULSION INTRAVENOUS at 11:06

## 2025-06-13 NOTE — ANESTHESIA PROCEDURE NOTES
Intubation    Date/Time: 6/13/2025 11:13 AM    Performed by: Ruba Villatoro CRNA  Authorized by: Gee Howell MD    Intubation:     Induction:  Intravenous    Intubated:  Postinduction    Mask Ventilation:  Easy mask    Attempts:  1    Attempted By:  CRNA    Method of Intubation:  Video laryngoscopy    Blade:  Stubbs 3    Laryngeal View Grade: Grade I - full view of cords      Difficult Airway Encountered?: No      Complications:  None    Airway Device:  Oral endotracheal tube    Airway Device Size:  7.0    Style/Cuff Inflation:  Cuffed    Inflation Amount (mL):  6    Tube secured:  23    Secured at:  The lips    Placement Verified By:  Capnometry    Complicating Factors:  None    Findings Post-Intubation:  BS equal bilateral and atraumatic/condition of teeth unchanged

## 2025-06-13 NOTE — PLAN OF CARE
Patient arrived to unit ambulating . Patient appears to be in no immediate distress. Patient prepared for surgery. Perioperative period discussed and all questions addressed. Family at the bedside, call bell within reach, and bed in lowest position.

## 2025-06-13 NOTE — BRIEF OP NOTE
[facility] - [facility] (1st Fl)  Brief Operative Note    SUMMARY     Surgery Date: 6/13/2025     Surgeons and Role:     * Christopher Sherman MD - Primary    Assisting Surgeon: None    Pre-op Diagnosis:  Stem cell donor [Z52.001]    Post-op Diagnosis:  Post-Op Diagnosis Codes:     * Stem cell donor [Z52.001]    Procedure(s) (LRB):  SURGICAL PROCUREMENT, BONE MARROW (Bilateral)    Anesthesia: General/MAC    Implants:  * No implants in log *    Operative Findings: successful collection of hematopoietic stem cells from the bone marrow    Estimated Blood Loss: * No values recorded between 6/13/2025 11:01 AM and 6/13/2025  2:00 PM *    Estimated Blood Loss has been documented.         Specimens:   Specimen (24h ago, onward)      None          * No specimens in log *    [id number]

## 2025-06-13 NOTE — TRANSFER OF CARE
Anesthesia Transfer of Care Note    Patient: Gabriel Nayak    Procedure(s) Performed: Procedure(s) (LRB):  SURGICAL PROCUREMENT, BONE MARROW (Bilateral)    Patient location: PACU    Anesthesia Type: general    Transport from OR: Transported from OR on 6-10 L/min O2 by face mask with adequate spontaneous ventilation    Post pain: adequate analgesia    Post assessment: no apparent anesthetic complications    Post vital signs: stable    Level of consciousness: sedated    Nausea/Vomiting: no nausea/vomiting    Complications: none    Transfer of care protocol was followed      Last vitals: Visit Vitals  BP (!) 105/55 (BP Location: Left arm, Patient Position: Lying)   Pulse 67   Temp 36.5 °C (97.7 °F) (Temporal)   Resp 20   SpO2 99%

## 2025-06-13 NOTE — OP NOTE
[facility]  SECTION OF HEMATOLOGY AND STEM CELL TRANSPLANTATION  HARVEST OF ALLOGENEIC BONE MARROW FOR TRANSPLANTATION:    Medical Record Number:   [id number]    Patient Name:   Gabriel Nayak    Date of Operation:   6/13/2025    Primary Surgeon for Case:   Christopher Sherman MD    All Surgeons in Case:   Surgeons and Role:     * Christopher Sherman MD - Primary  Jaimie Paul, PA-C - Primary First Assistant   Reina Daley NP-C - Assistant       Pre-op Diagnosis:  Pre-Op Diagnosis Codes:      * Stem cell donor [Z52.001]    Post-op Diagnosis:  Post-Op Diagnosis Codes:     * Stem cell donor [Z52.001]    Anesthesia:   Monitor Anesthesia Care    Title of Operation:  Procedure(s):  SURGICAL PROCUREMENT, BONE MARROW    Indications For Procedure:   Gabriel Nayak is a 49 y.o.-year-old healthy donor for bone marrow stem cells.    Findings At Operation/Details Of The Procedure:  Consent was signed by the patient prior to the procedure. Time out was called to confirm: patient's name, data of birth, procedure side and site. Equipment reviewed and deemed adequate prior to starting procedure. The patient was positioned in a prone position, was prepped and draped in a sterile manner to expose harvest sites. Bone Marrow [place] needle was used to aspirate the bone marrow from bilateral posterior iliac spines.    Number of cells collected (cell dose):  Bag 1: 2.15 X 10^8 TNC/kg in 1240 mL  Bag 2: 1.06 X 10^8 TNC/kg in 1045 mL  Total product: 3.21 X 10^8 TNC/kg in 2285 mL  Total post-processing: CD34 2.92 x 10^6 CD34/kg    Infusions:  Blood products administered:  1 Unit autologous  Amount of fluid infused in OR: 1L of normal saline    Complications:   * No complications entered in OR log *    IV Fluids:   1L normal saline    Drains Placed:   None    Specimens:     Orders Placed This Encounter   Procedures    CBC auto differential     Standing Status:   Standing     Number of Occurrences:   1     Comprehensive metabolic panel     Standing Status:   Standing     Number of Occurrences:   1    CBC with Differential     Standing Status:   Standing     Number of Occurrences:   1    Admit to Phase I recovery, transfer to phase II level of care when Eren score is 9 out of 10     Standing Status:   Standing     Number of Occurrences:   1    Vital signs     Per protocol     Standing Status:   Standing     Number of Occurrences:   1    Re-check Blood Glucose     In 15 minutes. If Blood Glucose remains less than 70 mg/dL, retreat as directed above and NOTIFY MD IMMEDIATELY.     Standing Status:   Standing     Number of Occurrences:   1    Intake and output Per protocol     Per protocol     Standing Status:   Standing     Number of Occurrences:   1    Apply warming blanket     As needed temperature 95F/35C or less     Standing Status:   Standing     Number of Occurrences:   1    Discharge home from Phase II when PADSS scoring system score is 9 to 10 on PADSS Scoring system met     Standing Status:   Standing     Number of Occurrences:   1    Notify Anesthesiologist     Pain score greater than 6 if not relieved after initial round of analgesics     Standing Status:   Standing     Number of Occurrences:   1    Notify anesthesiologist after 2 hours if Phase II level of care criteria not met     Standing Status:   Standing     Number of Occurrences:   1    Notify Physician - Potential Need of Opioid Reversal     Standing Status:   Standing     Number of Occurrences:   1     RR <=:   8     POSS >:   2    maintenance fluids per service     Standing Status:   Standing     Number of Occurrences:   1    Oxygen Continuous     Discontinue when Sp02 is greater than or equal to 95% of equal to Preop Sp02     Standing Status:   Standing     Number of Occurrences:   1     Device type::   Low flow     Device::   Simple Face Mask     Titrate O2 per Oxygen Titration Protocol::   Yes     Notify MD of::   Inability to achieve desired  SpO2    Pulse Oximetry Continuous     Standing Status:   Standing     Number of Occurrences:   1    Cardiac monitoring strips     Standing Status:   Standing     Number of Occurrences:   1    Type & Screen     Standing Status:   Standing     Number of Occurrences:   1    Place in Outpatient     Standing Status:   Standing     Number of Occurrences:   1     Admitting Provider:   SHIRLEY FIGUEROA [[id number]]     Diagnosis:   Stem cell donor [V59.02.ICD-9-CM]     Is the Future Attending Known?:   No     Special Needs::   No Special Needs [1]    DISCHARGE PATIENT           Standing Status:   Standing     Number of Occurrences:   1    Prepare RBC 1 Unit     AUTOLOGOUS UNIT ONLY. WILL ADMINISTER INTRA-OPERATIVELY.     Standing Status:   Standing     Number of Occurrences:   1     Number of Units:   1 Unit     Purpose of Preparation::   Pending Transfusion     Special Requirements:   Other (Specify Comments Below)     Donor Source (if applicable):   Autologous     * No implants in log *    Complications:   None    Disposition:   PACU - hemodynamically stable.    Return to clinic in 24 hours for an RN visit; appointment already confirmed.

## 2025-06-13 NOTE — PLAN OF CARE
Recovery care complete. Discharge instructions given to pt and family. Pt and family verbalized understanding of all instructions. Vital signs stable. Pt is in no acute distress at this time. Incision sites clean, dry, and intact. PIV removed. PO fluids given and tolerated well. Discharge home to self care with family.

## 2025-06-13 NOTE — ANESTHESIA PREPROCEDURE EVALUATION
[facility]  Anesthesia Pre-Operative Evaluation     Patient Name: Gabriel Nayak  YOB: 1975  MRN: [id number]  CSN: [id number]       Admit Date: 6/13/2025   Admit Team: Networked reference to record PCT   Hospital Day: 1  Date of Procedure: 6/13/2025  Anesthesia: General/MAC Procedure: Procedure(s) (LRB):  SURGICAL PROCUREMENT, BONE MARROW (Bilateral)  Pre-Operative Diagnosis: Stem cell donor [Z52.001]  Proceduralist:Surgeons and Role:     * Christopher Sherman MD - Primary  Code Status: No Order   Advanced Directive: <no information>  Isolation Precautions: No active isolations  Capacity: Full capacity     SUBJECTIVE:   Gabriel Nayak is a 49 y.o. male who  has a past medical history of Anxiety and GERD (gastroesophageal reflux disease).  No notes on file    Hospital LOS: 0 days  ICU LOS: Patient does not have an ICU stay during this admission.    he has a current medication list which includes the following long-term medication(s): alprazolam and pantoprazole.   Current Outpatient Medications   Medication Instructions    ALPRAZolam (XANAX) 1 mg, Daily    pantoprazole (PROTONIX) 20 mg, Daily     ALLERGIES:   Review of patient's allergies indicates:  No Known Allergies  LDA:   AIRWAY:         [unfilled]     Lines/Drains/Airways       None                  Anesthesia Evaluation      Airway   Mallampati: II  TM distance: Normal  Neck ROM: Normal ROM  Dental    (+) Intact    Pulmonary    Cardiovascular     Neuro/Psych      GI/Hepatic/Renal    (+) GERD well controlled    Endo/Other    Abdominal                  MEDICATIONS:     No current outpatient medications on file prior to encounter.      Inpatient Medications:  Antibiotics (From admission, onward)      None          VTE Risk Mitigation (From admission, onward)           Ordered     heparin, porcine (PF) 30,000 Units in electrolyte-A 300 mL  On Call Procedure         06/13/25 2119                      Current  "Medications[1]       History:   There are no hospital problems to display for this patient.    Surgical History:    has a past surgical history that includes repair, hernia, umbilical and Inguinal hernia repair (Right).   Social History:    reports being sexually active.  reports that he has never smoked. He has never used smokeless tobacco. He reports current alcohol use. He reports that he does not use drugs.    There were no vitals filed for this visit.  Vital Signs Range (Last 24H):       There is no height or weight on file to calculate BMI.  Wt Readings from Last 4 Encounters:   05/27/25 133.8 kg (294 lb 15.6 oz)   05/08/25 133.8 kg (295 lb)        Intake/Output - Last 3 Shifts       None          Lab Results   Component Value Date    WBC 5.65 05/27/2025    HGB 14.7 05/27/2025    HCT 44.1 05/27/2025     05/27/2025     05/27/2025    K 3.5 05/27/2025     05/27/2025    CREATININE 1.0 05/27/2025    BUN 14 05/27/2025    CO2 26 05/27/2025     05/27/2025    CALCIUM 8.5 (L) 05/27/2025    MG 1.6 05/27/2025    PHOS 2.2 (L) 05/27/2025    ALKPHOS 83 05/27/2025    ALT 41 05/27/2025    AST 35 05/27/2025    ALBUMIN 4.1 05/27/2025    INR 1.0 05/27/2025    PROTIME 11.1 05/27/2025    APTT 25.7 05/27/2025    TROPONINI <0.010 11/27/2020    BNP 48 11/27/2020     No results found for this or any previous visit (from the past 12 hours).  No results for input(s): "WBC", "HGB", "HCT", "PLT", "NA", "K", "CREATININE", "GLU", "INR", "LACTATE", "5HIAAPLASMA", "5HIAAURINT", "5HIAA", "[id number]" in the last 168 hours.  No LMP for male patient.    EKG:   Results for orders placed or performed during the hospital encounter of 05/27/25   EKG 12-lead    Collection Time: 05/27/25 11:36 AM   Result Value Ref Range    QRS Duration 98 ms    [facility] QTC Calculation 420 ms    Narrative    Test Reason : Z52.001,    Vent. Rate :  51 BPM     Atrial Rate :  51 BPM     P-R Int : 222 ms          QRS Dur :  98 ms      QT Int : 456 ms   "     P-R-T Axes :  42 -27  16 degrees    QTcB Int : 420 ms    Sinus bradycardia with 1st degree A-V block  Otherwise normal ECG  No previous ECGs available  Confirmed by Asad Blas ([id number]) on 5/27/2025 11:58:19 AM    Referred By: HANK SAGE           Confirmed By: Asad Blas     TTE:  No results found for this or any previous visit.    HAKEEM:  No results found for this or any previous visit.    Stress Test:  No results found for this or any previous visit.    No results found for this or any previous visit.    LHC:  No results found for this or any previous visit.    Cardiac Device Check   No results found for this or any previous visit.    No results found for this or any previous visit.                                                                                                               06/13/2025  Gabriel Nayak is a 49 y.o., male.      Pre-op Assessment    I have reviewed the Patient Summary Reports.       I have reviewed the Medications.     Review of Systems  Anesthesia Hx:  No problems with previous Anesthesia   History of prior surgery of interest to airway management or planning:  Previous anesthesia: General           Social:  Non-Smoker, Social Alcohol Use       Hepatic/GI:     GERD, well controlled         Gerd          Endocrine:        Morbid Obesity / BMI > 40      Physical Exam  General: Well nourished, Cooperative, Alert and Oriented    Airway:  Mallampati: II   Mouth Opening: Normal  TM Distance: Normal  Tongue: Normal  Neck ROM: Normal ROM    Dental:  Intact    Anesthesia Plan  Type of Anesthesia, risks & benefits discussed:    Anesthesia Type: MAC  Intra-op Monitoring Plan: Standard ASA Monitors  Post Op Pain Control Plan: multimodal analgesia and IV/PO Opioids PRN  Induction:  IV  Airway Plan: , Post-Induction  Informed Consent: Informed consent signed with the Patient and all parties understand the risks and agree with anesthesia plan.  All questions answered.   ASA Score:  3    Ready For Surgery From Anesthesia Perspective.   .             [1]  Current Facility-Administered Medications   Medication Dose Route Frequency Provider Last Rate Last Admin    heparin, porcine (PF) 30,000 Units in electrolyte-A 300 mL   Irrigation On Call Procedure Christopher Sherman MD

## 2025-06-30 NOTE — ANESTHESIA POSTPROCEDURE EVALUATION
Anesthesia Post Evaluation    Patient: Gabriel Nayak    Procedure(s) Performed: Procedure(s) (LRB):  SURGICAL PROCUREMENT, BONE MARROW (Bilateral)    Final Anesthesia Type: MAC      Patient location during evaluation: PACU  Patient participation: Yes- Able to Participate  Level of consciousness: awake and alert  Post-procedure vital signs: reviewed and stable  Pain management: adequate  Airway patency: patent    PONV status at discharge: No PONV  Anesthetic complications: no      Cardiovascular status: blood pressure returned to baseline  Respiratory status: unassisted  Hydration status: euvolemic  Follow-up not needed.              Vitals Value Taken Time   /88 06/13/25 15:45   Temp 36.5 °C (97.7 °F) 06/13/25 13:53   Pulse 71 06/13/25 15:45   Resp 20 06/13/25 15:45   SpO2 79 % 06/13/25 15:45         No case tracking events are documented in the log.      Pain/Eren Score: No data recorded

## (undated) DEVICE — NDL HYPO REG 25G X 1 1/2

## (undated) DEVICE — SKIN MARKERS DEROYAL

## (undated) DEVICE — GOWN POLY REINF BRTH SLV LG

## (undated) DEVICE — SYR ONLY LUER LOCK 20CC

## (undated) DEVICE — TOWEL OR DISP STRL BLUE 4/PK

## (undated) DEVICE — COVER PROXIMA MAYO STAND

## (undated) DEVICE — Device

## (undated) DEVICE — KIT COLL BONE MARROW 1.2L

## (undated) DEVICE — CUP MEDICINE STERILE 2OZ

## (undated) DEVICE — CHLORAPREP 10.5 ML APPLICATOR

## (undated) DEVICE — SYR 10CC LUER LOCK

## (undated) DEVICE — PAD ABDOMINAL STERILE 8X10IN

## (undated) DEVICE — SPONGE LAP 18X18 PREWASHED

## (undated) DEVICE — BOWL STERILE LARGE 32OZ

## (undated) DEVICE — TAPE SURG MEDIPORE 6X72IN